# Patient Record
Sex: MALE | URBAN - METROPOLITAN AREA
[De-identification: names, ages, dates, MRNs, and addresses within clinical notes are randomized per-mention and may not be internally consistent; named-entity substitution may affect disease eponyms.]

---

## 2018-12-18 ENCOUNTER — APPOINTMENT (OUTPATIENT)
Dept: URBAN - METROPOLITAN AREA CLINIC 282 | Age: 48
Setting detail: DERMATOLOGY
End: 2018-12-18

## 2018-12-18 DIAGNOSIS — Z12.83 ENCOUNTER FOR SCREENING FOR MALIGNANT NEOPLASM OF SKIN: ICD-10-CM

## 2018-12-18 DIAGNOSIS — D22 MELANOCYTIC NEVI: ICD-10-CM

## 2018-12-18 DIAGNOSIS — B36.0 PITYRIASIS VERSICOLOR: ICD-10-CM

## 2018-12-18 DIAGNOSIS — Z71.89 OTHER SPECIFIED COUNSELING: ICD-10-CM

## 2018-12-18 DIAGNOSIS — L70.8 OTHER ACNE: ICD-10-CM

## 2018-12-18 DIAGNOSIS — L50.1 IDIOPATHIC URTICARIA: ICD-10-CM

## 2018-12-18 PROBLEM — D22.5 MELANOCYTIC NEVI OF TRUNK: Status: ACTIVE | Noted: 2018-12-18

## 2018-12-18 PROCEDURE — 99203 OFFICE O/P NEW LOW 30 MIN: CPT

## 2018-12-18 PROCEDURE — OTHER REASSURANCE: OTHER

## 2018-12-18 PROCEDURE — OTHER OBSERVATION: OTHER

## 2018-12-18 PROCEDURE — OTHER PRESCRIPTION: OTHER

## 2018-12-18 PROCEDURE — OTHER OTHER: OTHER

## 2018-12-18 PROCEDURE — OTHER OBSERVATION AND MEASURE: OTHER

## 2018-12-18 PROCEDURE — OTHER COUNSELING: OTHER

## 2018-12-18 RX ORDER — KETOCONAZOLE 20.5 MG/ML
SHAMPOO, SUSPENSION TOPICAL
Qty: 1 | Refills: 2 | Status: ERX | COMMUNITY
Start: 2018-12-18

## 2018-12-18 ASSESSMENT — LOCATION SIMPLE DESCRIPTION DERM
LOCATION SIMPLE: UPPER BACK
LOCATION SIMPLE: LEFT UPPER BACK
LOCATION SIMPLE: POSTERIOR NECK
LOCATION SIMPLE: RIGHT UPPER BACK

## 2018-12-18 ASSESSMENT — LOCATION ZONE DERM
LOCATION ZONE: TRUNK
LOCATION ZONE: NECK

## 2018-12-18 ASSESSMENT — LOCATION DETAILED DESCRIPTION DERM
LOCATION DETAILED: RIGHT MEDIAL UPPER BACK
LOCATION DETAILED: INFERIOR THORACIC SPINE
LOCATION DETAILED: LEFT LATERAL TRAPEZIAL NECK
LOCATION DETAILED: LEFT INFERIOR MEDIAL UPPER BACK
LOCATION DETAILED: RIGHT SUPERIOR UPPER BACK

## 2018-12-18 NOTE — PROCEDURE: OTHER
Other (Free Text): Pt had  long era of hives 7 yrs ago, now recurring daily x 4wks.  Pt will contact  if occur another 2 wks, given labslip to draw
Detail Level: Detailed
Note Text (......Xxx Chief Complaint.): This diagnosis correlates with the
English